# Patient Record
Sex: FEMALE | Race: WHITE | NOT HISPANIC OR LATINO | Employment: STUDENT | URBAN - METROPOLITAN AREA
[De-identification: names, ages, dates, MRNs, and addresses within clinical notes are randomized per-mention and may not be internally consistent; named-entity substitution may affect disease eponyms.]

---
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
## 2025-01-14 DIAGNOSIS — R11.2 NAUSEA AND VOMITING, UNSPECIFIED VOMITING TYPE: ICD-10-CM

## 2025-01-14 RX ORDER — FAMOTIDINE 40 MG/1
40 TABLET, FILM COATED ORAL
Qty: 30 TABLET | Refills: 12 | Status: SHIPPED | OUTPATIENT
Start: 2025-01-14

## 2025-01-16 ENCOUNTER — PATIENT MESSAGE (OUTPATIENT)
Dept: PEDIATRIC NEUROLOGY | Facility: CLINIC | Age: 20
End: 2025-01-16
Payer: COMMERCIAL

## 2025-02-18 DIAGNOSIS — G40.109 FOCAL EPILEPSY: ICD-10-CM

## 2025-02-18 DIAGNOSIS — G40.219 PARTIAL SYMPTOMATIC EPILEPSY WITH COMPLEX PARTIAL SEIZURES, INTRACTABLE, WITHOUT STATUS EPILEPTICUS: ICD-10-CM

## 2025-02-18 RX ORDER — OXCARBAZEPINE 300 MG/1
TABLET, FILM COATED ORAL
Qty: 90 TABLET | Refills: 5 | Status: SHIPPED | OUTPATIENT
Start: 2025-02-18

## 2025-03-11 ENCOUNTER — OFFICE VISIT (OUTPATIENT)
Dept: PEDIATRIC NEUROLOGY | Facility: CLINIC | Age: 20
End: 2025-03-11
Payer: COMMERCIAL

## 2025-03-11 VITALS
BODY MASS INDEX: 22.73 KG/M2 | DIASTOLIC BLOOD PRESSURE: 72 MMHG | SYSTOLIC BLOOD PRESSURE: 123 MMHG | HEART RATE: 72 BPM | HEIGHT: 61 IN | WEIGHT: 120.38 LBS

## 2025-03-11 DIAGNOSIS — G43.001 MIGRAINE WITHOUT AURA AND WITH STATUS MIGRAINOSUS, NOT INTRACTABLE: ICD-10-CM

## 2025-03-11 DIAGNOSIS — G40.219 PARTIAL SYMPTOMATIC EPILEPSY WITH COMPLEX PARTIAL SEIZURES, INTRACTABLE, WITHOUT STATUS EPILEPTICUS: ICD-10-CM

## 2025-03-11 DIAGNOSIS — G40.109 FOCAL EPILEPSY: Primary | ICD-10-CM

## 2025-03-11 PROCEDURE — 3078F DIAST BP <80 MM HG: CPT | Mod: CPTII,S$GLB,, | Performed by: STUDENT IN AN ORGANIZED HEALTH CARE EDUCATION/TRAINING PROGRAM

## 2025-03-11 PROCEDURE — 3074F SYST BP LT 130 MM HG: CPT | Mod: CPTII,S$GLB,, | Performed by: STUDENT IN AN ORGANIZED HEALTH CARE EDUCATION/TRAINING PROGRAM

## 2025-03-11 PROCEDURE — G2211 COMPLEX E/M VISIT ADD ON: HCPCS | Mod: S$GLB,,, | Performed by: STUDENT IN AN ORGANIZED HEALTH CARE EDUCATION/TRAINING PROGRAM

## 2025-03-11 PROCEDURE — 99999 PR PBB SHADOW E&M-EST. PATIENT-LVL IV: CPT | Mod: PBBFAC,,, | Performed by: STUDENT IN AN ORGANIZED HEALTH CARE EDUCATION/TRAINING PROGRAM

## 2025-03-11 PROCEDURE — 99214 OFFICE O/P EST MOD 30 MIN: CPT | Mod: S$GLB,,, | Performed by: STUDENT IN AN ORGANIZED HEALTH CARE EDUCATION/TRAINING PROGRAM

## 2025-03-11 PROCEDURE — 3008F BODY MASS INDEX DOCD: CPT | Mod: CPTII,S$GLB,, | Performed by: STUDENT IN AN ORGANIZED HEALTH CARE EDUCATION/TRAINING PROGRAM

## 2025-03-11 RX ORDER — RIZATRIPTAN BENZOATE 10 MG/1
10 TABLET ORAL DAILY PRN
Qty: 9 TABLET | Refills: 5 | Status: SHIPPED | OUTPATIENT
Start: 2025-03-11

## 2025-03-11 RX ORDER — OXCARBAZEPINE 300 MG/1
450 TABLET, FILM COATED ORAL 2 TIMES DAILY
Qty: 90 TABLET | Refills: 5 | Status: SHIPPED | OUTPATIENT
Start: 2025-03-11

## 2025-03-11 RX ORDER — MIDAZOLAM 5 MG/.1ML
5 SPRAY NASAL DAILY PRN
Qty: 1 EACH | Refills: 2 | Status: SHIPPED | OUTPATIENT
Start: 2025-03-11

## 2025-03-11 NOTE — PATIENT INSTRUCTIONS
Continue OXC 450mg BID;  Nayzilam for seizure > 5min      Repeat EEG - if normal can attempt slow wean     Acute abortive treatment:     When migraine symptoms first develop, the patient should rest or sleep in a dark, quiet room with a cool cloth applied to forehead if possible. Early use of medication during the migraine attack, when the headache is still mild, is important      Step 1: For mild headaches or as first step in treatment, give ibuprofen solution or tablet 400mg every 4 to 6 hours as needed (max 4 doses in 24 hours)               -Limit to 14 days per month maximum to avoid medication overuse headache               -If this medication proves ineffective, would next try naproxen sodium tablet 440mg every 8 to 12 hours as needed (max daily dose 1000mg)      Step 2: If step 1 medication does not get rid of headache, or if headache is severe from the start, also give rizatriptan 10mg oral tablet               -This dose may be repeated a second time if headache still remains after 2 hours, with maximum of 2 doses per 24 hours               -Limit use to 9 days per month to avoid medication overuse headache               -You may combine this medication with naproxen 5mg/kg for better effect if it is only somewhat effective               - Side effects may include chest pain/pressure/tightness, hot/cold flashes, sore throat, fatigue, feeling of heaviness, tingling, jaw pain/pressure, neck pain               -If this medication proves ineffective, would next try rizatriptan 10mg ODT     Daily preventive treatment     Given that this patient has frequent or long-lasting migraines, migraines that cause significant disability, will initiate prevention at this time with:     1) riboflavin (vitamin B2) 400mg per day in 1-2 doses. This may cause stomach upset if taken on empty stomach. It can cause bright yellow or yellow-orange discoloration of urine   2) elemental magnesium or magnesium oxide at 400MG in 1-2  doses. May cause diarrhea     They have previously tried 0 other preventive medications which were stopped for either side effects or lack of efficacy               -Should be continued for at least 6-8 weeks before determining effectiveness               -Headache diary should be maintained so that frequency of headaches can be compared once on the medication              -If this proves ineffective or side effects are not tolerated, would next try amitriptyline               -If medication proves effective, it should be continued for at least 6-12 months before considering to wean medication      Lifestyle measures   Education: Check out [employer] for more education on headaches, a website created by pediatric headache specialists   Sleep: Work on getting sufficient sleep along with keeping relatively constant bedtime and wake-up times on weekdays and weekends  Exercise: Regular exercise for at least 30 minutes a day for 5 days a week may decrease frequency of headaches   Hydration: Aim to drink at least 64 ounces of water every day, ideally 80 ounces. Carry a water bottle around to school to make this easier   Meals: Avoid fasting or skipping meals because this may trigger headaches      Utilize mychart to notify office of side effects, effects of acute medications after 2-3 tries, effects of preventive medications after 6-8 weeks     Return to clinic in ~6 months for reassessment

## 2025-03-11 NOTE — PROGRESS NOTES
Subjective:      Patient ID: Nora Phillip is a 19 y.o. female here for   Chief Complaint   Patient presents with    Migraine        Interim #4:     Current HA freq: 3 days out of last 30, with 0 days considered bad/severe  Last HA freq: 3 days out of prior 30d, with 2 days considered bad/severe     Current acute: ibuprofen/rizatriptan  Current preventive: magox/riboflavin      Trileptal 450mg bid - taking without seizure or s/e     Interim #3:    Current HA freq: 3 days out of last 30, with 2 days considered bad/severe  Last HA freq: 0 days out of prior 30d, with 0 days considered bad/severe     Current acute: ibuprofen/rizatriptan  Current preventive: none;     sLEEP: 10PM-630am   Meals: doesn't skip meals   Water: 16oz per day;     Oxc 450MG twice daily;     Interim #2: At last visit I prescribed ibuprofen and rizatriptan as acute headache treatment and planned to start twice daily magnesium and riboflavin for nutraceutical headache prevention. Planned repeat eeg not done yet. Last OXC level was 21, improved     Current HA freq: 0 days out of last 30, with 0 days considered bad/severe  Last HA freq: 12 days out of prior 30d, with 4 days considered bad/severe     Current acute: ibuprofen/rizatriptan  Current preventive: none;     Interim 1: 12/30 with 4 bad HA currently    Ibuprofen 400mg - works sometimes     17 y.o. female with PMH of IVH, multifocal epilepsy, Chiari I malformation and hydrocephalus s/p ETV      Interval history 01/3/2023:  Very low dose OXC  Had been seizure free for 5 years  Had 2 events early December, with tremors to her arms and legs and was nauseated followed by post ictal lethargy.  Dr. Diaz increased OXC from 450 mg daily to 600 mg daily.  Since then no further seizures.  Has labs with PCM, trileptal level was 10.  Tsh and NA WNL.        he also follows with Dr Taylor since Nov 2017 after she developed hydrocephalus and was admitted for ETV.    shunt was placed  10/31/20  Family history of migraines     She has episodes of overheating and dehydration        CT head 10/30/20- Interval placement of ventriculostomy shunt catheter, as discussed above with findings consistent with recent procedure, ventricular dilatation again noted although stable when compared to the prior MRI examination with perhaps mild diminished prominence of the temporal horns.  There is no evidence for significant interval detrimental change.     MRI head 2/13/19- No appreciable residual defect along the floor of the 3rd ventricle and no appreciable flow through this region, raising the question of closure of the 3rd ventriculostomy.  Mild prominence of the supratentorial ventricles is unchanged.  Unchanged appearance of tract through the right frontal parenchyma and defect in the septum pellucidum.  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  Unchanged mild inferior descent of the cerebellar tonsils, with CSF flow through the foramen magnum.     MRI head 11/27/17- Interval operative change status post right frontal approach endoscopic third ventriculostomy. There is small postoperative fluid tract within the right frontal lobe   There is continued though relatively similar moderate distention of the lateral and third ventricles   Continued small caliber fourth ventricle with configuration posterior fossa compatible with Chiari I malformation     MRI head 8/21/17- Moderate distention of the lateral and third ventricles which may represent ventriculomegaly in light of history however uncertain of chronicity and lack of prior imaging for comparison. There is trace flair hyperintensity in the periventricular white matter suggestive for scarring versus small component of transependymal resorption of CSF. Clinical correlation advised.  Chiari I malformation.  Superimposed colpocephaly with small caliber white matter parietal lobes concerning for possible prior PVL     EEG 8.21.17 read by me-  multifocal sharps most frequent over right frontal area        Current Outpatient Medications   Medication Instructions    cloNIDine (CATAPRES) 0.2 mg    cloNIDine 0.2 mg/24 hr td ptwk (CATAPRES) 0.2 mg/24 hr 0.2mg per day 1/day    dextroamphetamine-amphetamine 5 mg Tab 2 tablets    famotidine (PEPCID) 40 mg, Oral    LO LOESTRIN FE 1 mg-10 mcg (24)/10 mcg (2) Tab 1 tablet, Daily    NAYZILAM 5 mg, Each Nostril, Daily PRN    ondansetron (ZOFRAN) 4 mg, Oral, Every 6 hours PRN    OXcarbazepine (TRILEPTAL) 300 MG Tab TAKE 1 & 1/2 TABLETS BY MOUTH TWICE DAILY    rizatriptan (MAXALT) 10 mg, Oral, Daily PRN          Review of Systems   Constitutional:  Negative for fatigue, fever and unexpected weight change.   HENT:  Negative for congestion, dental problem, ear pain, hearing loss, sinus pain and sore throat.    Eyes:  Positive for photophobia. Negative for pain and visual disturbance.   Respiratory:  Negative for cough and shortness of breath.    Cardiovascular:  Negative for chest pain and palpitations.   Gastrointestinal:  Positive for nausea. Negative for abdominal pain, constipation, diarrhea and vomiting.   Genitourinary:  Negative for difficulty urinating.   Musculoskeletal:  Negative for arthralgias, back pain, gait problem and neck pain.   Skin:  Negative for rash.   Allergic/Immunologic: Negative for environmental allergies.   Neurological:  Positive for seizures and headaches. Negative for dizziness, tremors, syncope, speech difficulty, weakness, light-headedness and numbness.   Hematological:  Does not bruise/bleed easily.   Psychiatric/Behavioral:  Negative for confusion and sleep disturbance. The patient is not nervous/anxious.        Objective:   Neurologic Exam     Mental Status   Follows 2 step commands.   Attention: normal. Concentration: normal.   Speech: speech is normal   Level of consciousness: alert    Cranial Nerves     CN II   Visual fields full to confrontation.     CN III, IV, VI   Pupils are  "equal, round, and reactive to light.  Extraocular motions are normal.   Nystagmus: none     CN V   Facial sensation intact.     CN VII   Facial expression full, symmetric.     CN VIII   Hearing: intact    CN XII   Tongue deviation: none    Motor Exam   Muscle bulk: normal  Overall muscle tone: normal    Strength   Strength 5/5 throughout.     Sensory Exam   Light touch normal.     Gait, Coordination, and Reflexes     Gait  Gait: normal    Coordination   Finger to nose coordination: normal  Tandem walking coordination: normal    Reflexes   Right brachioradialis: 2+  Right biceps: 2+  Right triceps: 2+  Right patellar: 2+  Right achilles: 2+  Right ankle clonus: absent  Left ankle clonus: absent    /72   Pulse 72   Ht 5' 1.14" (1.553 m)   Wt 54.6 kg (120 lb 5.9 oz)   BMI 22.64 kg/m²      Physical Exam  Vitals reviewed.   Constitutional:       Appearance: Normal appearance.   HENT:      Head: Normocephalic.   Eyes:      Conjunctiva/sclera: Conjunctivae normal.      Pupils: Pupils are equal, round, and reactive to light.   Pulmonary:      Effort: Pulmonary effort is normal. No respiratory distress.   Musculoskeletal:         General: No swelling. Normal range of motion.   Neurological:      Mental Status: She is alert.      Coordination: Finger-Nose-Finger Test normal.      Gait: Gait is intact. Tandem walk normal.      Deep Tendon Reflexes:      Reflex Scores:       Tricep reflexes are 2+ on the right side.       Bicep reflexes are 2+ on the right side.       Brachioradialis reflexes are 2+ on the right side.       Patellar reflexes are 2+ on the right side.       Achilles reflexes are 2+ on the right side.  Psychiatric:         Speech: Speech normal.       Assessment:     Nora is a 19 Years old female with PMH of IVH, multifocal epilepsy, Chiari I malformation and hydrocephalus s/p ETV  who presents for evaluation of epilepsy, migraine     This patient meets criteria for a diagnosis of Episodic Migraine w/o " aura due to the following:    Recurrent (at least 5) episodes of moderate to severe head pain lasting (2 or more) hours and accompanied by:  - Nausea and/or vomiting  - Photophobia  - Phonophobia     Seizures well controlled, will repeat EEG next appt and could consider wean in future, continuing OXC for now     Headaches have remained low frequency even without nutraceuticals so suggested adding these for even more improvement if possible;    Plan:     Plan:     Continue OXC 450mg BID;  Nayzilam for seizure > 5min     Repeat EEG - if normal can attempt slow wean    Acute abortive treatment:    When migraine symptoms first develop, the patient should rest or sleep in a dark, quiet room with a cool cloth applied to forehead if possible. Early use of medication during the migraine attack, when the headache is still mild, is important     Step 1: For mild headaches or as first step in treatment, give ibuprofen solution or tablet 400mg every 4 to 6 hours as needed (max 4 doses in 24 hours)    -Limit to 14 days per month maximum to avoid medication overuse headache    -If this medication proves ineffective, would next try naproxen sodium tablet 440mg every 8 to 12 hours as needed (max daily dose 1000mg)     Step 2: If step 1 medication does not get rid of headache, or if headache is severe from the start, also give rizatriptan 10mg oral tablet    -This dose may be repeated a second time if headache still remains after 2 hours, with maximum of 2 doses per 24 hours    -Limit use to 9 days per month to avoid medication overuse headache    -You may combine this medication with naproxen 5mg/kg for better effect if it is only somewhat effective    - Side effects may include chest pain/pressure/tightness, hot/cold flashes, sore throat, fatigue, feeling of heaviness, tingling, jaw pain/pressure, neck pain    -If this medication proves ineffective, would next try rizatriptan 10mg ODT    Daily preventive treatment    Given that  this patient has frequent or long-lasting migraines, migraines that cause significant disability, will initiate prevention at this time with:    1) riboflavin (vitamin B2) 400mg per day in 1-2 doses. This may cause stomach upset if taken on empty stomach. It can cause bright yellow or yellow-orange discoloration of urine   2) elemental magnesium or magnesium oxide at 400MG in 1-2 doses. May cause diarrhea    They have previously tried 0 other preventive medications which were stopped for either side effects or lack of efficacy    -Should be continued for at least 6-8 weeks before determining effectiveness    -Headache diary should be maintained so that frequency of headaches can be compared once on the medication   -If this proves ineffective or side effects are not tolerated, would next try amitriptyline    -If medication proves effective, it should be continued for at least 6-12 months before considering to wean medication     Lifestyle measures   Education: Check out [employer] for more education on headaches, a website created by pediatric headache specialists   Sleep: Work on getting sufficient sleep along with keeping relatively constant bedtime and wake-up times on weekdays and weekends  Exercise: Regular exercise for at least 30 minutes a day for 5 days a week may decrease frequency of headaches   Hydration: Aim to drink at least 64 ounces of water every day, ideally 80 ounces. Carry a water bottle around to school to make this easier   Meals: Avoid fasting or skipping meals because this may trigger headaches     Utilize mychart to notify office of side effects, effects of acute medications after 2-3 tries, effects of preventive medications after 6-8 weeks    Return to clinic in ~6 months for reassessment     Grant Varghese MD  [facility]   [facility]

## 2025-03-12 ENCOUNTER — OFFICE VISIT (OUTPATIENT)
Dept: PEDIATRIC GASTROENTEROLOGY | Facility: CLINIC | Age: 20
End: 2025-03-12
Payer: COMMERCIAL

## 2025-03-12 VITALS
WEIGHT: 117.75 LBS | HEIGHT: 60 IN | HEART RATE: 65 BPM | OXYGEN SATURATION: 99 % | TEMPERATURE: 97 F | BODY MASS INDEX: 23.12 KG/M2 | DIASTOLIC BLOOD PRESSURE: 76 MMHG | SYSTOLIC BLOOD PRESSURE: 123 MMHG

## 2025-03-12 DIAGNOSIS — R11.2 NAUSEA AND VOMITING, UNSPECIFIED VOMITING TYPE: Primary | ICD-10-CM

## 2025-03-12 DIAGNOSIS — K21.00 GASTROESOPHAGEAL REFLUX DISEASE WITH ESOPHAGITIS WITHOUT HEMORRHAGE: ICD-10-CM

## 2025-03-12 DIAGNOSIS — R10.33 PERIUMBILICAL ABDOMINAL PAIN: ICD-10-CM

## 2025-03-12 PROCEDURE — 3008F BODY MASS INDEX DOCD: CPT | Mod: CPTII,S$GLB,, | Performed by: PEDIATRICS

## 2025-03-12 PROCEDURE — 3074F SYST BP LT 130 MM HG: CPT | Mod: CPTII,S$GLB,, | Performed by: PEDIATRICS

## 2025-03-12 PROCEDURE — 1160F RVW MEDS BY RX/DR IN RCRD: CPT | Mod: CPTII,S$GLB,, | Performed by: PEDIATRICS

## 2025-03-12 PROCEDURE — 1159F MED LIST DOCD IN RCRD: CPT | Mod: CPTII,S$GLB,, | Performed by: PEDIATRICS

## 2025-03-12 PROCEDURE — 3078F DIAST BP <80 MM HG: CPT | Mod: CPTII,S$GLB,, | Performed by: PEDIATRICS

## 2025-03-12 PROCEDURE — 99214 OFFICE O/P EST MOD 30 MIN: CPT | Mod: S$GLB,,, | Performed by: PEDIATRICS

## 2025-03-12 PROCEDURE — 99999 PR PBB SHADOW E&M-EST. PATIENT-LVL IV: CPT | Mod: PBBFAC,,, | Performed by: PEDIATRICS

## 2025-03-12 RX ORDER — FAMOTIDINE 20 MG/1
20 TABLET, FILM COATED ORAL 2 TIMES DAILY
Qty: 60 TABLET | Refills: 12 | Status: SHIPPED | OUTPATIENT
Start: 2025-03-12

## 2025-03-12 NOTE — PROGRESS NOTES
Subjective:       Patient ID: Nora Phillip is a 19 y.o. female.    Chief Complaint: Follow-up    HPI  Review of Systems   Constitutional:  Positive for fatigue. Negative for activity change, appetite change, fever and unexpected weight change.   HENT:  Positive for trouble swallowing. Negative for congestion, hearing loss, mouth sores, rhinorrhea and sore throat.    Eyes:  Negative for photophobia and visual disturbance.   Respiratory:  Negative for apnea, cough, choking, chest tightness, shortness of breath, wheezing and stridor.    Cardiovascular:  Negative for chest pain.   Gastrointestinal:  Positive for abdominal pain, nausea and vomiting. Negative for blood in stool and diarrhea.   Endocrine: Negative for heat intolerance.   Genitourinary:  Negative for decreased urine volume, dysuria and menstrual problem.   Musculoskeletal:  Positive for arthralgias. Negative for back pain, joint swelling, myalgias, neck pain and neck stiffness.   Skin:  Positive for pallor. Negative for rash.   Allergic/Immunologic: Negative for food allergies.   Neurological:  Positive for seizures, weakness and headaches.   Hematological:  Negative for adenopathy. Does not bruise/bleed easily.   Psychiatric/Behavioral:  Positive for sleep disturbance. Negative for agitation. The patient is not nervous/anxious and is not hyperactive.        Objective:      Physical Exam    Assessment:       1. Nausea and vomiting, unspecified vomiting type    2. Gastroesophageal reflux disease with esophagitis without hemorrhage    3. Periumbilical abdominal pain        Plan:         CHIEF COMPLAINT: Patient is here for follow up of abdominal pain nausea and vomiting.    HISTORY OF PRESENT ILLNESS:  Patient follows up today for ongoing care above symptoms.  She does better with 20 mg twice a day then 40 mg daily of the Pepcid.  As long as she takes the Pepcid her symptoms are under good control.  She reports no real pain or vomiting.  She gets  "these symptoms if she comes off of the medication.  She did have a few eosinophils on a biopsies from EGD a few years back consistent with reflux.  Bowel movements are normal.  No menstrual cycles as her oral contraceptive as stopped those.  She was seen by Neurology yesterday.  They plan to try and wean her seizure medications this summer.  Mom says that pediatric services still plan to see her for now.  No trouble swallowing.    STUDIES REVIEWED:  Increased eosinophils on biopsy consistent with reflux on her EGD    MEDICATIONS/ALLERGIES: The patient's MedCard has been reviewed and/or reconciled.    PMH, SH, FH, all reviewed and no changes except as noted.    PHYSICAL EXAMINATION:   /76 (BP Location: Right arm, Patient Position: Sitting)   Pulse 65   Temp 97.4 °F (36.3 °C) (Temporal)   Ht 5' 0.39" (1.534 m)   Wt 53.4 kg (117 lb 11.6 oz)   SpO2 99%   BMI 22.69 kg/m²  weight stable the 35th percentile  Remainder of vital signs unremarkable, please refer to vital signs sheet.  General: Alert, WN, WH, NAD  Chest: Clear to auscultation bilaterally.No increased work of breathing   Heart: Regular, rate and rhythm without murmur  Abdomen: Soft, non tender, non distended, no hepatosplenomegaly, no stool masses, no rebound or guarding.  Extremities: Symmetric, well perfused and no edema.      IMPRESSION/PLAN:  Patient follows up today for ongoing care above symptoms.  Patient is stable on the Pepcid.  We will send it in for b.i.d. dosing at 20 mg.  I did discuss transition of care to adult GI.  Certainly if she is being followed by some of the pediatric services still we can continue to do so as well.  I can certainly see her back at least 1 more time in a year and then look to transition care.  She maybe able to wean off of her seizure medications and not need neurology follow-up.  She likely will be followed by her current pediatric cardiologist but in [facility] over time.  Will make a " referral next year after seeing her back at least 1 more time.  Patient Instructions   Pepcid 20 mg Po 2x/day  Limit/avoid symptom inducing food  Follow up one year     This was discussed at length with parents who expressed understanding and agreement. Questions were answered.  This note has been dictated using voice recognition software.  Note sent to referring physician via [employer] or fax

## 2025-03-12 NOTE — LETTER
March 12, 2025        Carrington Tinajero MD  [address] [place] [address]  [address]  [place] [address]             [address]  [address]  [place] [address]  Phone: [phone]   Patient: Nora Phillip   MR Number: [id number]   YOB: 2005   Date of Visit: 3/12/2025       Dear Dr. Tinajero:    Thank you for referring Nora Phillip to me for evaluation. Attached you will find relevant portions of my assessment and plan of care.    If you have questions, please do not hesitate to call me. I look forward to following Nora Phillip along with you.    Sincerely,      Tyson Gant MD            CC  No Recipients    Enclosure

## 2025-03-31 ENCOUNTER — TELEPHONE (OUTPATIENT)
Dept: NEUROSURGERY | Facility: CLINIC | Age: 20
End: 2025-03-31
Payer: COMMERCIAL

## 2025-03-31 NOTE — TELEPHONE ENCOUNTER
Called pt mom and told her that falls are most likely mechanical in nature. Need to  f/u with pcp for full body workup    ----- Message from Tia sent at 3/28/2025 11:55 AM CDT -----  Who Called: Pt mom What is the request in detail: Requesting call back to discuss pt fell and needs urgent call back. Please adviseCan the clinic reply by [facility]? [place] Call Back Number: [phone] Information:

## 2025-04-01 ENCOUNTER — HOSPITAL ENCOUNTER (EMERGENCY)
Facility: HOSPITAL | Age: 20
Discharge: HOME | End: 2025-04-02
Attending: STUDENT IN AN ORGANIZED HEALTH CARE EDUCATION/TRAINING PROGRAM
Payer: COMMERCIAL

## 2025-04-01 DIAGNOSIS — R51.9 ACUTE NONINTRACTABLE HEADACHE, UNSPECIFIED HEADACHE TYPE: Primary | ICD-10-CM

## 2025-04-01 PROCEDURE — 86803 HEPATITIS C AB TEST: CPT | Performed by: PHYSICIAN ASSISTANT

## 2025-04-01 PROCEDURE — 99285 EMERGENCY DEPT VISIT HI MDM: CPT

## 2025-04-01 PROCEDURE — 84460 ALANINE AMINO (ALT) (SGPT): CPT | Performed by: STUDENT IN AN ORGANIZED HEALTH CARE EDUCATION/TRAINING PROGRAM

## 2025-04-01 PROCEDURE — 87389 HIV-1 AG W/HIV-1&-2 AB AG IA: CPT | Performed by: PHYSICIAN ASSISTANT

## 2025-04-01 PROCEDURE — 85025 COMPLETE CBC W/AUTO DIFF WBC: CPT | Performed by: STUDENT IN AN ORGANIZED HEALTH CARE EDUCATION/TRAINING PROGRAM

## 2025-04-01 PROCEDURE — 81025 URINE PREGNANCY TEST: CPT | Performed by: EMERGENCY MEDICINE

## 2025-04-02 ENCOUNTER — PATIENT MESSAGE (OUTPATIENT)
Dept: NEUROSURGERY | Facility: CLINIC | Age: 20
End: 2025-04-02
Payer: COMMERCIAL

## 2025-04-02 ENCOUNTER — PATIENT MESSAGE (OUTPATIENT)
Dept: PEDIATRIC NEUROLOGY | Facility: CLINIC | Age: 20
End: 2025-04-02
Payer: COMMERCIAL

## 2025-04-02 VITALS
HEIGHT: 60 IN | SYSTOLIC BLOOD PRESSURE: 118 MMHG | WEIGHT: 117 LBS | OXYGEN SATURATION: 98 % | RESPIRATION RATE: 16 BRPM | HEART RATE: 64 BPM | DIASTOLIC BLOOD PRESSURE: 78 MMHG | BODY MASS INDEX: 22.97 KG/M2 | TEMPERATURE: 98 F

## 2025-04-02 LAB
ABSOLUTE EOSINOPHIL (OHS): 0.27 K/UL
ABSOLUTE MONOCYTE (OHS): 0.85 K/UL (ref 0.3–1)
ABSOLUTE NEUTROPHIL COUNT (OHS): 5.7 K/UL (ref 1.8–7.7)
ALBUMIN SERPL BCP-MCNC: 4.2 G/DL (ref 3.5–5.2)
ALP SERPL-CCNC: 121 UNIT/L (ref 40–150)
ALT SERPL W/O P-5'-P-CCNC: 18 UNIT/L (ref 10–44)
ANION GAP (OHS): 16 MMOL/L (ref 8–16)
AST SERPL-CCNC: 19 UNIT/L (ref 11–45)
B-HCG UR QL: NEGATIVE
BASOPHILS # BLD AUTO: 0.04 K/UL
BASOPHILS NFR BLD AUTO: 0.4 %
BILIRUB SERPL-MCNC: 0.3 MG/DL (ref 0.1–1)
BUN SERPL-MCNC: 20 MG/DL (ref 6–20)
CALCIUM SERPL-MCNC: 10 MG/DL (ref 8.7–10.5)
CHLORIDE SERPL-SCNC: 107 MMOL/L (ref 95–110)
CO2 SERPL-SCNC: 16 MMOL/L (ref 23–29)
CREAT SERPL-MCNC: 0.7 MG/DL (ref 0.5–1.4)
CTP QC/QA: YES
ERYTHROCYTE [DISTWIDTH] IN BLOOD BY AUTOMATED COUNT: 12.2 % (ref 11.5–14.5)
GFR SERPLBLD CREATININE-BSD FMLA CKD-EPI: >60 ML/MIN/1.73/M2
GLUCOSE SERPL-MCNC: 77 MG/DL (ref 70–110)
HCT VFR BLD AUTO: 49.8 % (ref 37–48.5)
HCV AB SERPL QL IA: NORMAL
HGB BLD-MCNC: 16.1 GM/DL (ref 12–16)
HIV 1+2 AB+HIV1 P24 AG SERPL QL IA: NORMAL
IMM GRANULOCYTES # BLD AUTO: 0.03 K/UL (ref 0–0.04)
IMM GRANULOCYTES NFR BLD AUTO: 0.3 % (ref 0–0.5)
LYMPHOCYTES # BLD AUTO: 2.59 K/UL (ref 1–4.8)
MCH RBC QN AUTO: 28.8 PG (ref 27–31)
MCHC RBC AUTO-ENTMCNC: 32.3 G/DL (ref 32–36)
MCV RBC AUTO: 89 FL (ref 82–98)
NUCLEATED RBC (/100WBC) (OHS): 0 /100 WBC
PLATELET # BLD AUTO: 261 K/UL (ref 150–450)
PMV BLD AUTO: 10.7 FL (ref 9.2–12.9)
POTASSIUM SERPL-SCNC: 4.3 MMOL/L (ref 3.5–5.1)
PROT SERPL-MCNC: 7.5 GM/DL (ref 6–8.4)
RBC # BLD AUTO: 5.6 M/UL (ref 4–5.4)
RELATIVE EOSINOPHIL (OHS): 2.8 %
RELATIVE LYMPHOCYTE (OHS): 27.3 % (ref 18–48)
RELATIVE MONOCYTE (OHS): 9 % (ref 4–15)
RELATIVE NEUTROPHIL (OHS): 60.2 % (ref 38–73)
SODIUM SERPL-SCNC: 139 MMOL/L (ref 136–145)
WBC # BLD AUTO: 9.48 K/UL (ref 3.9–12.7)

## 2025-04-02 NOTE — HPI
18 yo who presents after having an episode of positional nausea and headache. Pt reports yesterday that when she sat up she became nauseated and began to have a headache that resolved after she layed back down. Pt reports that she is doing better now and no longer has nausea while sitting up and her headache is much improved while sitting up as well. CTH and XRSS were obtained showing stable ventricular caliber and Certas at 4. Valve pump and refills easily

## 2025-04-02 NOTE — ASSESSMENT & PLAN NOTE
18 yo with PMH of Chiari I malformation and hydrocephalus s/p ETV x 2 and placement of a right VPS (Certas @4) presenting today due to headaches and an episode of positional nausea that occurred yesterday. CTH showed stable ventricular caliber and XRSS showed a certas at 4 without discontinuity in her shunt tubing.    Recommendations:  --NSGY evaluated at bedside  --Labs/imaging reviewed  --no acute neurosurgical intervention at this time  --Will schedule 2 week video visit for follow up  --Okay to dc home    Discussed with Dr. An  Dispo: home   Patient

## 2025-04-02 NOTE — FIRST PROVIDER EVALUATION
Emergency Department First Provider Evaluation    Nora Phillip   19 y.o. female   [id number]      4/1/2025        Medical screening examination initiated prior to patient room assignment.        History of present illness:   shunt. Having positional head pain and nausea.    Vitals:    04/01/25 2038   BP: 126/82   BP Location: Right arm   Pulse: 86   Resp: 16   Temp: 98.1 °F (36.7 °C)   TempSrc: Oral   SpO2: 100%   Weight: 53.1 kg (117 lb)   Height: 5' (1.524 m)       Pertinent physical examination findings:  No distress.    Brief workup plan:  CT and shunt series ordered. Await bed for provider evaluation.          This brief and focused assessment was performed to initiate workup and treatment. Follow-up of these results will be performed by the assigned treatment team. Additional data collection, labs, imaging studies, and treatments may be needed for completion of this patient encounter.

## 2025-04-02 NOTE — ED PROVIDER NOTES
Encounter Date: 4/1/2025       History     Chief Complaint   Patient presents with    Headache     Headaches and dizziness.  Shunt     HPI    18 y/o F PMH chiari malformation, hydrocephalus,  shunt, epilepsy who presents to the ED for evaluation for a headache.  Patient follows with KORIN here.  She comes in today with a posterior headache for a few days. Associated with nausea. Worse with certain positions.   No head trauma.  Denies any numbness, tingling, head strike, neck pain, abd pain, or vision/speech changes.      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    Seizures      Past Surgical History:   Procedure Laterality Date    ANGIOGRAM, CORONARY, PEDIATRIC  1/3/2024    Procedure: Angiogram, Coronary, Pediatric;  Surgeon: Dony Cunningham Jr., MD;  Location: [facility] CATH LAB;  Service: Pediatric Cardiology;;    CLOSURE, PFO, PEDIATRIC N/A 1/3/2024    Procedure: Closure, PFO, Pediatric;  Surgeon: Dony Cunningham Jr., MD;  Location: [facility] CATH LAB;  Service: Pediatric Cardiology;  Laterality: N/A;    COMBINED RIGHT AND RETROGRADE LEFT HEART CATHETERIZATION FOR CONGENITAL HEART DEFECT N/A 1/3/2024    Procedure: Catheterization, Heart, Combined Right and Retrograde Left, for Congenital Heart Defect;  Surgeon: Dony Cunningham Jr., MD;  Location: [facility] CATH LAB;  Service: Pediatric Cardiology;  Laterality: N/A;    ECHOCARDIOGRAM,TRANSESOPHAGEAL  1/3/2024    Procedure: Transesophageal echo (GAMA) intra-procedure log documentation;  Surgeon: Dony Cunningham Jr., MD;  Location: [facility] CATH LAB;  Service: Pediatric Cardiology;;    ENDOSCOPIC VENTRICULOSTOMY Right 5/7/2020    Procedure: VENTRICULOSTOMY, ENDOSCOPIC;  Surgeon: Arun Taylor MD;  Location: [facility] OR [address] [place] [address];  Service: Neurosurgery;  Laterality: Right;  regular bed, [place], supine, toronto I, asa 1, stealth      ESOPHAGOGASTRODUODENOSCOPY N/A 9/27/2019    Procedure: ESOPHAGOGASTRODUODENOSCOPY (EGD);  Surgeon: Tyson Gant MD;  Location: [facility] ([address] [place] [address]);  Service: Endoscopy;  Laterality: N/A;    ETV      EYE SURGERY      9 months old. both eyes    REVISION OF VENTRICULOPERITONEAL SHUNT Right 2/5/2021    Procedure: REVISION, SHUNT, VENTRICULOPERITONEAL;  Surgeon: Rita Franklin MD;  Location: [facility] OR [address] [place] [address];  Service: Neurosurgery;  Laterality: Right;     Family History   Problem Relation Name Age of Onset    Migraines Mother      Hypertension Mother      Diabetes Father      Hypertension Father      No Known Problems Sister      No Known Problems Sister      Pacemaker/defibrilator Paternal Grandfather      Arrhythmia Neg Hx      Cardiomyopathy Neg Hx      Congenital heart disease Neg Hx      Heart attacks under age 50 Neg Hx       Social History[1]  Review of Systems   Neurological:  Positive for headaches.       Physical Exam     Initial Vitals [04/01/25 2038]   BP Pulse Resp Temp SpO2   126/82 86 16 98.1 °F (36.7 °C) 100 %      MAP       --         Physical Exam    Nursing note and vitals reviewed.  Constitutional: She appears well-nourished.   HENT:   Head: Atraumatic.   Eyes: EOM are normal.   Neck: Neck supple.   No C spine TTP   Normal range of motion.  Cardiovascular:  Normal rate and regular rhythm.           Pulmonary/Chest: Breath sounds normal. No respiratory distress.   Musculoskeletal:         General: No edema. Normal range of motion.      Cervical back: Normal range of motion and neck supple.     Neurological: She is alert and oriented to person, place, and time. No cranial nerve deficit or sensory deficit.   5/5 strength UE and LE extremities, all sensation intact, symmetric smile, tongue midline, no aphasia, no dysarthria, normal gait, NIHSS zero, no cerebellar signs   Skin: Skin is warm and dry.   Psychiatric: She has a normal mood and affect. Thought content normal.         ED Course    Procedures  Labs Reviewed   COMPREHENSIVE METABOLIC PANEL - Abnormal       Result Value    Sodium 139      Potassium 4.3      Chloride 107      CO2 16 (*)     Glucose 77      BUN 20      Creatinine 0.7      Calcium 10.0      Protein Total 7.5      Albumin 4.2      Bilirubin Total 0.3            AST 19      ALT 18      Anion Gap 16      eGFR >60     CBC WITH DIFFERENTIAL - Abnormal    WBC 9.48      RBC 5.60 (*)     HGB 16.1 (*)     HCT 49.8 (*)     MCV 89      MCH 28.8      MCHC 32.3      RDW 12.2      Platelet Count 261      MPV 10.7      Nucleated RBC 0      Neut % 60.2      Lymph % 27.3      Mono % 9.0      Eos % 2.8      Basophil % 0.4      Imm Grans % 0.3      Neut # 5.70      Lymph # 2.59      Mono # 0.85      Eos # 0.27      Baso # 0.04      Imm Grans # 0.03     HEPATITIS C ANTIBODY - Normal    Hep C Ab Interp Non-Reactive     HIV 1 / 2 ANTIBODY - Normal    HIV 1/2 Ag/Ab Non-Reactive     CBC W/ AUTO DIFFERENTIAL    Narrative:     The following orders were created for panel order CBC auto differential.  Procedure                               Abnormality         Status                     ---------                               -----------         ------                     CBC with Differential[[id number]]       Abnormal            Final result                 Please view results for these tests on the individual orders.   POCT URINE PREGNANCY    POC Preg Test, Ur Negative       Acceptable Yes                   Medications - No data to display  Medical Decision Making  Amount and/or Complexity of Data Reviewed  Labs: ordered. Decision-making details documented in ED Course.  Radiology: ordered and independent interpretation performed. Decision-making details documented in ED Course.                                  20 y/o F here with a headache. Hx of VPS.  VSS in ED, neuro intact on exam, NIHSS zero.  Patient declined anything for her symptoms while in the ED.  Normal WBC, normal  dagoberto.  XR shunt series appears normal.  Pending CTB and then NSGY consult at shift change, oncoming MD to follow and dispo. Patient and family updated on plan, she has remained stable and neuro intact.        Clinical Impression:  Final diagnoses:  [R51.9] Acute nonintractable headache, unspecified headache type (Primary)          ED Disposition Condition    Discharge Stable          ED Prescriptions    None       Follow-up Information       Follow up With Specialties Details Why Contact Info Additional Information    [facility] - Neurosurgery [facility]   [address]  [facility] [address] [phone] 8th Floor Clinic May Please park in [facility]. Check in desk is located in the lobby. Please take the C elevator to 8th floor which opens to the lobby.                 [1]   Social History  Tobacco Use    Smoking status: Never     Passive exposure: Never    Smokeless tobacco: Never   Substance Use Topics    Alcohol use: No    Drug use: No        Deandre Estrada MD  04/02/25 8217

## 2025-04-02 NOTE — CONSULTS
[facility] - Emergency Dept  Neurosurgery  Consult Note    Inpatient consult to Neurosurgery  Consult performed by: Wade De Jesus MD  Consult ordered by: Amy Layton MD        Subjective:     Chief Complaint/Reason for Admission: headache    History of Present Illness: 20 yo who presents after having an episode of positional nausea and headache. Pt reports yesterday that when she sat up she became nauseated and began to have a headache that resolved after she layed back down. Pt reports that she is doing better now and no longer has nausea while sitting up and her headache is much improved while sitting up as well. CTH and XRSS were obtained showing stable ventricular caliber and Certas at 4. 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    Seizures      Past Surgical History:   Procedure Laterality Date    ANGIOGRAM, CORONARY, PEDIATRIC  1/3/2024    Procedure: Angiogram, Coronary, Pediatric;  Surgeon: Dony Cunningham Jr., MD;  Location: [facility] CATH LAB;  Service: Pediatric Cardiology;;    CLOSURE, PFO, PEDIATRIC N/A 1/3/2024    Procedure: Closure, PFO, Pediatric;  Surgeon: Dony Cunningham Jr., MD;  Location: [facility] CATH LAB;  Service: Pediatric Cardiology;  Laterality: N/A;    COMBINED RIGHT AND RETROGRADE LEFT HEART CATHETERIZATION FOR CONGENITAL HEART DEFECT N/A 1/3/2024    Procedure: Catheterization, Heart, Combined Right and Retrograde Left, for Congenital Heart Defect;  Surgeon: Dony Cunningham Jr., MD;  Location: [facility] CATH LAB;  Service: Pediatric Cardiology;  Laterality: N/A;    ECHOCARDIOGRAM,TRANSESOPHAGEAL  1/3/2024    Procedure: Transesophageal echo (GAMA) intra-procedure log documentation;  Surgeon: Dony Cunningham Jr., MD;  Location: [facility] CATH LAB;  Service: Pediatric Cardiology;;    ENDOSCOPIC  VENTRICULOSTOMY Right 5/7/2020    Procedure: VENTRICULOSTOMY, ENDOSCOPIC;  Surgeon: Arun Taylor MD;  Location: [facility] OR 2ND FLR;  Service: Neurosurgery;  Laterality: Right;  regular bed, [place], supine, tor[place] I, asa 1, stealth     ESOPHAGOGASTRODUODENOSCOPY N/A 9/27/2019    Procedure: ESOPHAGOGASTRODUODENOSCOPY (EGD);  Surgeon: Tyson Gant MD;  Location: [facility] ENDO (2ND FLR);  Service: Endoscopy;  Laterality: N/A;    ETV      EYE SURGERY      9 months old. both eyes    REVISION OF VENTRICULOPERITONEAL SHUNT Right 2/5/2021    Procedure: REVISION, SHUNT, VENTRICULOPERITONEAL;  Surgeon: Rita Franklin MD;  Location: [facility] OR 2ND FLR;  Service: Neurosurgery;  Laterality: Right;     Family History       Problem Relation (Age of Onset)    Diabetes Father    Hypertension Mother, Father    Migraines Mother    No Known Problems Sister, Sister    Pacemaker/defibrilator Paternal Grandfather          Tobacco Use    Smoking status: Never     Passive exposure: Never    Smokeless tobacco: Never   Substance and Sexual Activity    Alcohol use: No    Drug use: No    Sexual activity: Never     Review of Systems  Objective:     Weight: 53.1 kg (117 lb)  Body mass index is 22.85 kg/m².  Vital Signs (Most Recent):  Temp: 98 °F (36.7 °C) (04/02/25 0203)  Pulse: 65 (04/02/25 0203)  Resp: 16 (04/02/25 0203)  BP: 119/81 (04/02/25 0203)  SpO2: 100 % (04/02/25 0203) Vital Signs (24h Range):  Temp:  [98 °F (36.7 °C)-98.1 °F (36.7 °C)] 98 °F (36.7 °C)  Pulse:  [65-86] 65  Resp:  [16] 16  SpO2:  [100 %] 100 %  BP: (119-126)/(81-82) 119/81                                 Physical Exam         Neurosurgery Physical Exam  Neurosurgery Physical Exam    General: well developed, well nourished, no distress.   HEENT: normocephalic, atraumatic  CV: regular rate   Pulmonary: normal respirations, no signs of respiratory distress  Abdomen: soft, non-distended, not tender to palpation  Skin: Skin is warm, dry and intact  Heme: no bruising    Neuro:  "  Mental Status: AO x4, no aphasia, no dysarthria   CN: PERRL, EOMI, VF intact to confrontation, sensation intact bilaterally, eyebrow raise and grimace symmetric, tongue midline  Motor: moves all extremities spontaneously, full strength throughout, no pronator drift   Sensory: intact to light touch throughout  Cerebellar: finger to nose intact bilaterally  Reflexes: -Jules's, -Babinski, no clonus. Patellar: 2+ bilaterally     Valve pump/refills briskly  Significant Labs:  No results for input(s): "GLU", "NA", "K", "CL", "CO2", "BUN", "CREATININE", "CALCIUM", "MG" in the last 48 hours.  Recent Labs   Lab 04/01/25  2336   WBC 9.48   HGB 16.1*   HCT 49.8*        No results for input(s): "LABPT", "INR", "APTT" in the last 48 hours.  Microbiology Results (last 7 days)       ** No results found for the last 168 hours. **          All pertinent labs from the last 24 hours have been reviewed.    Significant Diagnostics:  CT: CT Head Without Contrast  Result Date: 4/2/2025  Right parietal ventriculostomy shunt catheter is in unchanged position.  Ventricles are unchanged in size without evidence of hydrocephalus. Recurrent prominence of the dural venous sinuses, increased from 12/16/2024 but similar to 04/23/2024.  This could be related to intracranial hypotension.  Extensive dural venous sinus thrombosis could also potentially cause this appearance but is considered less likely.  Correlate clinically, and with follow-up imaging if clinically warranted. Otherwise, CT demonstrates no evidence of acute intracranial process. This report was flagged in Epic as abnormal. Electronically signed by resident: Oscar Narayan Date:    04/02/2025 Time:    01:45 Electronically signed by: Chaim Jewell Date:    04/02/2025 Time:    03:24    I have reviewed all pertinent imaging results/findings within the past 24 hours.    Assessment/Plan:     Chronic headaches  20 yo with PMH of Chiari I malformation and hydrocephalus s/p ETV x " 2 and placement of a right VPS (Certas @4) presenting today due to headaches and an episode of positional nausea that occurred yesterday. CTH showed stable ventricular caliber and XRSS showed a certas at 4 without discontinuity in her shunt tubing.    Recommendations:  --NSGY evaluated at bedside  --Labs/imaging reviewed  --no acute neurosurgical intervention at this time  --Will schedule 2 week video visit for follow up  --Okay to dc home    Discussed with Dr. Juve Uribe: home        Thank you for your consult.     Wade De Jesus MD  Neurosurgery  [facility]       [1] (Not in a hospital admission)

## 2025-04-02 NOTE — ED TRIAGE NOTES
"Nora Phillip, a 19 y.o. female presents to the ED w/ complaint of     Triage note:  Chief Complaint   Patient presents with    Headache     Headaches and dizziness.  Shunt   Pt denies blurred vision, dizziness, unilateral weakness. Reports constant pain to back of head down neck. Pt also states had one episode of legs "giving out" while on stairs causing pt to fall this past Friday. Pt denies striking head. Denies LOC. Denies recent seizures.   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

## 2025-04-03 ENCOUNTER — TELEPHONE (OUTPATIENT)
Dept: NEUROSURGERY | Facility: CLINIC | Age: 20
End: 2025-04-03
Payer: COMMERCIAL

## 2025-04-09 ENCOUNTER — PATIENT MESSAGE (OUTPATIENT)
Dept: NEUROSURGERY | Facility: CLINIC | Age: 20
End: 2025-04-09
Payer: COMMERCIAL

## 2025-04-17 PROBLEM — G91.8 OTHER HYDROCEPHALUS: Status: ACTIVE | Noted: 2017-11-09

## 2025-04-17 RX ORDER — TETRACAINE HYDROCHLORIDE 5 MG/ML
1 SOLUTION OPHTHALMIC DAILY PRN
COMMUNITY
Start: 2025-01-06

## 2025-04-17 RX ORDER — OFLOXACIN 3 MG/ML
2 SOLUTION/ DROPS OPHTHALMIC EVERY 4 HOURS
COMMUNITY
Start: 2025-01-06

## 2025-04-22 ENCOUNTER — OFFICE VISIT (OUTPATIENT)
Dept: NEUROSURGERY | Facility: CLINIC | Age: 20
End: 2025-04-22
Payer: COMMERCIAL

## 2025-04-22 DIAGNOSIS — Z98.2 S/P VP SHUNT: Primary | ICD-10-CM

## 2025-04-22 NOTE — PROGRESS NOTES
Digital Medicine: Video Consult    The chief complaint leading to consultation is: f/u after ER visit  Patient Location: [facility]  ERROL Y PED [facility] [address] [facility] [place]  Provider is at a distant location to the patients originating location.  Visit type: audiovisual  Present with the patient at the time of the consultation: None    Each patient to whom he or she provides medical services by telemedicine is: (1) informed of the relationship between the physician and patient and the respective role of any other health care provider with respect to management of the patient; and (2) notified that he or she may decline to receive medical services by telemedicine and may withdraw from such care at any time.        Nora Phillip is a 19 y.o. female who returns for evaluation after recent ER visit 4/9/2025.  She has a h/o hydrocephalus s/p ETV x 2 with persistent symptoms and decreased flow on CSF flow studies now s/p right VPS (Delta 1.5) on 10/30/20 and shunt revision 2/5/21 (Certas now at 4).  She reports interval resolution of her neck pain and nausea and denies any new concerns or symptoms.  Headaches are overall improved in frequency since her last visit with me.    Imaging from the ER was reviewed.    - will keep follow up as previously scheduled     15 minutes of total time spent on the encounter, which includes face to face time and non-face to face time preparing to see the patient (eg, review of tests), Obtaining and/or reviewing separately obtained history, Documenting clinical information in the electronic or other health record, Independently interpreting results (not separately reported) and communicating results to the patient/family/caregiver, or Care coordination (not separately reported).

## 2025-04-25 ENCOUNTER — OFFICE VISIT (OUTPATIENT)
Dept: PEDIATRIC NEUROLOGY | Facility: CLINIC | Age: 20
End: 2025-04-25
Payer: COMMERCIAL

## 2025-04-25 DIAGNOSIS — G40.109 FOCAL EPILEPSY: Primary | ICD-10-CM

## 2025-04-25 DIAGNOSIS — G43.001 MIGRAINE WITHOUT AURA AND WITH STATUS MIGRAINOSUS, NOT INTRACTABLE: ICD-10-CM

## 2025-04-25 DIAGNOSIS — G93.5 CHIARI I MALFORMATION: ICD-10-CM

## 2025-04-25 DIAGNOSIS — G40.219 PARTIAL SYMPTOMATIC EPILEPSY WITH COMPLEX PARTIAL SEIZURES, INTRACTABLE, WITHOUT STATUS EPILEPTICUS: ICD-10-CM

## 2025-04-25 DIAGNOSIS — G91.0 COMMUNICATING HYDROCEPHALUS: ICD-10-CM

## 2025-04-25 DIAGNOSIS — G93.89 CEREBRAL VENTRICULOMEGALY: ICD-10-CM

## 2025-04-25 PROCEDURE — 98006 SYNCH AUDIO-VIDEO EST MOD 30: CPT | Mod: 95,,, | Performed by: STUDENT IN AN ORGANIZED HEALTH CARE EDUCATION/TRAINING PROGRAM

## 2025-04-25 PROCEDURE — G2211 COMPLEX E/M VISIT ADD ON: HCPCS | Mod: 95,,, | Performed by: STUDENT IN AN ORGANIZED HEALTH CARE EDUCATION/TRAINING PROGRAM

## 2025-04-25 RX ORDER — MIDAZOLAM 5 MG/.1ML
5 SPRAY NASAL DAILY PRN
Qty: 1 EACH | Refills: 2 | Status: SHIPPED | OUTPATIENT
Start: 2025-04-25

## 2025-04-25 RX ORDER — RIZATRIPTAN BENZOATE 10 MG/1
10 TABLET ORAL DAILY PRN
Qty: 9 TABLET | Refills: 5 | Status: SHIPPED | OUTPATIENT
Start: 2025-04-25

## 2025-04-25 RX ORDER — OXCARBAZEPINE 300 MG/1
450 TABLET, FILM COATED ORAL 2 TIMES DAILY
Qty: 90 TABLET | Refills: 5 | Status: SHIPPED | OUTPATIENT
Start: 2025-04-25

## 2025-04-25 RX ORDER — VITS A,C,E/LUTEIN/MINERALS 300MCG-200
200 TABLET ORAL DAILY
Qty: 30 TABLET | Refills: 3 | Status: SHIPPED | OUTPATIENT
Start: 2025-04-25

## 2025-04-25 NOTE — PROGRESS NOTES
The patient location is: home  The chief complaint leading to consultation is: migraines, epilepsy    Visit type: audiovisual    Face to Face time with patient: 30m  40 minutes of total time spent on the encounter, which includes face to face time and non-face to face time preparing to see the patient (eg, review of tests), Obtaining and/or reviewing separately obtained history, Documenting clinical information in the electronic or other health record, Independently interpreting results (not separately reported) and communicating results to the patient/family/caregiver, or Care coordination (not separately reported).         Each patient to whom he or she provides medical services by telemedicine is:  (1) informed of the relationship between the physician and patient and the respective role of any other health care provider with respect to management of the patient; and (2) notified that he or she may decline to receive medical services by telemedicine and may withdraw from such care at any time.    Notes:        Subjective:      Patient ID: Nora Phillpi is a 19 y.o. female here for   Chief Complaint   Patient presents with    Migraine    Seizures      interim #5: end of march on the 28th was walking to go see advisor and get classes scheduled and was going down a small flight of stairs and didn't trip or anything but all of a sudden her legs just gave out of her and perhaps were a bit shaky; [facility] wouldn't check her out - then a few days later neck was hurting bad - thought perhaps chiari or hydrocephalus     Current HA freq: 8 days out of last 30, with 4 days considered bad/severe  Last HA freq: 3 days out of prior 30d, with 0 days considered bad/severe     Current acute: ibuprofen/rizatriptan   Current preventive: none    Trileptal 450mg bid - taking without seizure or s/e     School is stressful    Sleep: 10pm (1030-11) - 640  Meals: breakfast  Water: bottle     Interim #4:     Current HA freq:  3 days out of last 30, with 0 days considered bad/severe  Last HA freq: 3 days out of prior 30d, with 2 days considered bad/severe     Current acute: ibuprofen/rizatriptan  Current preventive: magox/riboflavin      Trileptal 450mg bid - taking without seizure or s/e     Interim #3:    Current HA freq: 3 days out of last 30, with 2 days considered bad/severe  Last HA freq: 0 days out of prior 30d, with 0 days considered bad/severe     Current acute: ibuprofen/rizatriptan  Current preventive: none;     sLEEP: 10PM-630am   Meals: doesn't skip meals   Water: 16oz per day;     Oxc 450MG twice daily;     Interim #2: At last visit I prescribed ibuprofen and rizatriptan as acute headache treatment and planned to start twice daily magnesium and riboflavin for nutraceutical headache prevention. Planned repeat eeg not done yet. Last OXC level was 21, improved     Current HA freq: 0 days out of last 30, with 0 days considered bad/severe  Last HA freq: 12 days out of prior 30d, with 4 days considered bad/severe     Current acute: ibuprofen/rizatriptan  Current preventive: none;     Interim 1: 12/30 with 4 bad HA currently    Ibuprofen 400mg - works sometimes     17 y.o. female with PMH of IVH, multifocal epilepsy, Chiari I malformation and hydrocephalus s/p ETV      Interval history 01/3/2023:  Very low dose OXC  Had been seizure free for 5 years  Had 2 events early December, with tremors to her arms and legs and was nauseated followed by post ictal lethargy.  Dr. Diaz increased OXC from 450 mg daily to 600 mg daily.  Since then no further seizures.  Has labs with PCM, trileptal level was 10.  Tsh and NA WNL.        he also follows with Dr Taylor since Nov 2017 after she developed hydrocephalus and was admitted for ETV.    shunt was placed 10/31/20  Family history of migraines     She has episodes of overheating and dehydration        CT head 10/30/20- Interval placement of ventriculostomy shunt catheter, as discussed above  with findings consistent with recent procedure, ventricular dilatation again noted although stable when compared to the prior MRI examination with perhaps mild diminished prominence of the temporal horns.  There is no evidence for significant interval detrimental change.     MRI head 2/13/19- No appreciable residual defect along the floor of the 3rd ventricle and no appreciable flow through this region, raising the question of closure of the 3rd ventriculostomy.  Mild prominence of the supratentorial ventricles is unchanged.  Unchanged appearance of tract through the right frontal parenchyma and defect in the septum pellucidum.  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  Unchanged mild inferior descent of the cerebellar tonsils, with CSF flow through the foramen magnum.     MRI head 11/27/17- Interval operative change status post right frontal approach endoscopic third ventriculostomy. There is small postoperative fluid tract within the right frontal lobe   There is continued though relatively similar moderate distention of the lateral and third ventricles   Continued small caliber fourth ventricle with configuration posterior fossa compatible with Chiari I malformation     MRI head 8/21/17- Moderate distention of the lateral and third ventricles which may represent ventriculomegaly in light of history however uncertain of chronicity and lack of prior imaging for comparison. There is trace flair hyperintensity in the periventricular white matter suggestive for scarring versus small component of transependymal resorption of CSF. Clinical correlation advised.  Chiari I malformation.  Superimposed colpocephaly with small caliber white matter parietal lobes concerning for possible prior PVL     EEG 8.21.17 read by me- multifocal sharps most frequent over right frontal area        Current Outpatient Medications   Medication Instructions    cloNIDine (CATAPRES) 0.2 mg    cloNIDine 0.2 mg/24 hr td ptwk  (CATAPRES) 0.2 mg/24 hr 0.2mg per day 1/day    dextroamphetamine-amphetamine 5 mg Tab 2 tablets    famotidine (PEPCID) 20 mg, Oral, 2 times daily    LO LOESTRIN FE 1 mg-10 mcg (24)/10 mcg (2) Tab 1 tablet, Daily    NAYZILAM 5 mg, Each Nostril, Daily PRN    ofloxacin (OCUFLOX) 0.3 % ophthalmic solution 2 drops, Every 4 hours    ondansetron (ZOFRAN) 4 mg, Oral, Every 6 hours PRN    OXcarbazepine (TRILEPTAL) 450 mg, Oral, 2 times daily    rizatriptan (MAXALT) 10 mg, Oral, Daily PRN    TETRAcaine HCL 0.5% (PONTOCAINE) 0.5 % ophthalmic solution 1 drop, Daily PRN          Review of Systems   Constitutional:  Negative for fatigue, fever and unexpected weight change.   HENT:  Negative for congestion, dental problem, ear pain, hearing loss, sinus pain and sore throat.    Eyes:  Positive for photophobia. Negative for pain and visual disturbance.   Respiratory:  Negative for cough and shortness of breath.    Cardiovascular:  Negative for chest pain and palpitations.   Gastrointestinal:  Positive for nausea. Negative for abdominal pain, constipation, diarrhea and vomiting.   Genitourinary:  Negative for difficulty urinating.   Musculoskeletal:  Negative for arthralgias, back pain, gait problem and neck pain.   Skin:  Negative for rash.   Allergic/Immunologic: Negative for environmental allergies.   Neurological:  Positive for seizures and headaches. Negative for dizziness, tremors, syncope, speech difficulty, weakness, light-headedness and numbness.   Hematological:  Does not bruise/bleed easily.   Psychiatric/Behavioral:  Negative for confusion and sleep disturbance. The patient is not nervous/anxious.        Objective:   Neurologic Exam     Mental Status   Follows 2 step commands.   Attention: normal. Concentration: normal.   Speech: speech is normal   Level of consciousness: alert    Cranial Nerves     CN II   Visual fields full to confrontation.     CN III, IV, VI   Pupils are equal, round, and reactive to  light.  Extraocular motions are normal.   Nystagmus: none     CN V   Facial sensation intact.     CN VII   Facial expression full, symmetric.     CN VIII   Hearing: intact    CN XII   Tongue deviation: none    Motor Exam   Muscle bulk: normal  Overall muscle tone: normal    Strength   Strength 5/5 throughout.     Sensory Exam   Light touch normal.     Gait, Coordination, and Reflexes     Gait  Gait: normal    Coordination   Finger to nose coordination: normal  Tandem walking coordination: normal    Reflexes   Right brachioradialis: 2+  Right biceps: 2+  Right triceps: 2+  Right patellar: 2+  Right achilles: 2+  Right ankle clonus: absent  Left ankle clonus: absent    There were no vitals taken for this visit.     Physical Exam  Vitals reviewed.   Constitutional:       Appearance: Normal appearance.   HENT:      Head: Normocephalic.   Eyes:      Conjunctiva/sclera: Conjunctivae normal.      Pupils: Pupils are equal, round, and reactive to light.   Pulmonary:      Effort: Pulmonary effort is normal. No respiratory distress.   Musculoskeletal:         General: No swelling. Normal range of motion.   Neurological:      Mental Status: She is alert.      Coordination: Finger-Nose-Finger Test normal.      Gait: Gait is intact. Tandem walk normal.      Deep Tendon Reflexes:      Reflex Scores:       Tricep reflexes are 2+ on the right side.       Bicep reflexes are 2+ on the right side.       Brachioradialis reflexes are 2+ on the right side.       Patellar reflexes are 2+ on the right side.       Achilles reflexes are 2+ on the right side.  Psychiatric:         Speech: Speech normal.       Assessment:     Nora is a 19 Years old female with PMH of IVH, multifocal epilepsy, Chiari I malformation and hydrocephalus s/p ETV  who presents for evaluation of epilepsy, migraine     This patient meets criteria for a diagnosis of Episodic Migraine w/o aura due to the following:    Recurrent (at least 5) episodes of moderate to severe  head pain lasting (2 or more) hours and accompanied by:  - Nausea and/or vomiting  - Photophobia  - Phonophobia     Seizures well controlled, will repeat EEG next appt and could consider wean in future, continuing OXC for now     Headaches have remained low frequency even without nutraceuticals so suggested adding these for even more improvement if possible;    Plan:     Plan:     Continue OXC 450mg BID;  Nayzilam for seizure > 5min     Repeat EEG - if normal can attempt slow wean    Acute abortive treatment:    When migraine symptoms first develop, the patient should rest or sleep in a dark, quiet room with a cool cloth applied to forehead if possible. Early use of medication during the migraine attack, when the headache is still mild, is important     Step 1: For mild headaches or as first step in treatment, give ibuprofen solution or tablet 400mg every 4 to 6 hours as needed (max 4 doses in 24 hours)    -Limit to 14 days per month maximum to avoid medication overuse headache    -If this medication proves ineffective, would next try naproxen sodium tablet 440mg every 8 to 12 hours as needed (max daily dose 1000mg)     Step 2: If step 1 medication does not get rid of headache, or if headache is severe from the start, also give rizatriptan 10mg oral tablet    -This dose may be repeated a second time if headache still remains after 2 hours, with maximum of 2 doses per 24 hours    -Limit use to 9 days per month to avoid medication overuse headache    -You may combine this medication with naproxen 5mg/kg for better effect if it is only somewhat effective    - Side effects may include chest pain/pressure/tightness, hot/cold flashes, sore throat, fatigue, feeling of heaviness, tingling, jaw pain/pressure, neck pain    -If this medication proves ineffective, would next try rizatriptan 10mg ODT    Daily preventive treatment    Given that this patient has frequent or long-lasting migraines, migraines that cause significant  disability, will initiate prevention at this time with:    1) riboflavin (vitamin B2) 400mg per day in 1-2 doses. This may cause stomach upset if taken on empty stomach. It can cause bright yellow or yellow-orange discoloration of urine   2) elemental magnesium or magnesium oxide at 400MG in 1-2 doses. May cause diarrhea    They have previously tried 0 other preventive medications which were stopped for either side effects or lack of efficacy    -Should be continued for at least 6-8 weeks before determining effectiveness    -Headache diary should be maintained so that frequency of headaches can be compared once on the medication   -If this proves ineffective or side effects are not tolerated, would next try amitriptyline    -If medication proves effective, it should be continued for at least 6-12 months before considering to wean medication     Lifestyle measures   Education: Check out [employer] for more education on headaches, a website created by pediatric headache specialists   Sleep: Work on getting sufficient sleep along with keeping relatively constant bedtime and wake-up times on weekdays and weekends  Exercise: Regular exercise for at least 30 minutes a day for 5 days a week may decrease frequency of headaches   Hydration: Aim to drink at least 64 ounces of water every day, ideally 80 ounces. Carry a water bottle around to school to make this easier   Meals: Avoid fasting or skipping meals because this may trigger headaches     Utilize mychart to notify office of side effects, effects of acute medications after 2-3 tries, effects of preventive medications after 6-8 weeks    Return to clinic in ~6 months for reassessment     Grant Varghese MD  [facility]   [facility]

## 2025-06-09 ENCOUNTER — TELEPHONE (OUTPATIENT)
Dept: PEDIATRIC NEUROLOGY | Facility: CLINIC | Age: 20
End: 2025-06-09
Payer: COMMERCIAL

## 2025-06-18 RX ORDER — TRIAMCINOLONE ACETONIDE 1 MG/G
1 CREAM TOPICAL NIGHTLY
COMMUNITY
Start: 2025-05-16

## 2025-06-18 RX ORDER — LORATADINE 10 MG/1
10 TABLET ORAL DAILY
COMMUNITY
Start: 2025-05-16

## 2025-06-18 RX ORDER — NYSTATIN 100000 U/G
1 CREAM TOPICAL 2 TIMES DAILY
COMMUNITY
Start: 2025-05-16

## 2025-07-02 ENCOUNTER — PATIENT MESSAGE (OUTPATIENT)
Dept: PEDIATRIC NEUROLOGY | Facility: CLINIC | Age: 20
End: 2025-07-02
Payer: COMMERCIAL

## 2025-07-09 ENCOUNTER — PROCEDURE VISIT (OUTPATIENT)
Dept: PEDIATRIC NEUROLOGY | Facility: CLINIC | Age: 20
End: 2025-07-09
Payer: COMMERCIAL

## 2025-07-09 DIAGNOSIS — G40.219 PARTIAL SYMPTOMATIC EPILEPSY WITH COMPLEX PARTIAL SEIZURES, INTRACTABLE, WITHOUT STATUS EPILEPTICUS: ICD-10-CM

## 2025-07-09 DIAGNOSIS — G40.109 FOCAL EPILEPSY: ICD-10-CM

## 2025-07-09 PROCEDURE — 95819 EEG AWAKE AND ASLEEP: CPT | Mod: S$GLB,,, | Performed by: STUDENT IN AN ORGANIZED HEALTH CARE EDUCATION/TRAINING PROGRAM

## 2025-07-14 ENCOUNTER — PATIENT MESSAGE (OUTPATIENT)
Dept: PEDIATRIC NEUROLOGY | Facility: CLINIC | Age: 20
End: 2025-07-14
Payer: COMMERCIAL

## 2025-07-21 NOTE — PROCEDURES
EEG,w/awake & asleep record    Date/Time: 7/9/2025 11:00 AM    Performed by: Carlin Pack MD  Authorized by: Grant Varghese MD      ELECTROENCEPHALOGRAM REPORT    DATE OF SERVICE: 07/09/25  EEG NUMBER: OP   REQUESTED BY: Dr. Varghese  LOCATION OF SERVICE: OP    Clinical History: Nora Phillip is a 20 y.o. female with migraine and epilepsy.    Current Medications[1]    METHODOLOGY   Electroencephalographic (EEG) recording is with electrodes placed according to the International 10-20 placement system.  Thirty two (32) channels of digital signal (sampling rate of 512/sec) including T1 and T2 was simultaneously recorded from the scalp and may include  EKG, EMG, and/or eye monitors.  Recording band pass was 0.1 to 512 hz.  Digital video recording of the patient is simultaneously recorded with the EEG.  The patient is instructed report clinical symptoms which may occur during the recording session.  EEG and video recording is stored and archived in digital format. Activation procedures which include photic stimulation, hyperventilation and instructing patients to perform simple task are done in selected patients.    The EEG is displayed on a monitor screen and can be reviewed using different montages.  Computer assisted analysis is employed to detect spike and electrographic seizure activity.   The entire record is submitted for computer analysis.  The entire recording is visually reviewed and the times identified by computer analysis as being spikes or seizures are reviewed again.  Compresses spectral analysis (CSA) is also performed on the activity recorded from each individual channel.  This is displayed as a power display of frequencies from 0 to 30 Hz over time.   The CSA is reviewed looking for asymmetries in power between homologous areas of the scalp and then compared with the original EEG recording.     [employer] software was also utilized in the review of this study.  This software suite  analyzes the EEG recording in multiple domains.  Coherence and rhythmicity is computed to identify EEG sections which may contain organized seizures.  Each channel undergoes analysis to detect presence of spike and sharp waves which have special and morphological characteristic of epileptic activity.  The routine EEG recording is converted from spacial into frequency domain.  This is then displayed comparing homologous areas to identify areas of significant asymmetry.  Algorithm to identify non-cortically generated artifact is used to separate eye movement, EMG and other artifact from the EEG    Conditions of recording: This 47 minute EEG was record with the patient awake, drowsy and asleep.    Description:  The record was well organized. The waking EEG was characterized by a 10-11 Hz posterior dominant rhythm.  The background over the rest of the head was predominantly in the alpha frequency range. Faster activity in the beta frequency range was present bifrontally. There was a well-developed anterior-posterior gradient.  Drowsiness was characterized by attenuation of the posterior background rhythm.Stage 1 sleep was characterized by symmetric vertex waves. Stage 2 sleep was characterized by the appearance of symmetric sleep spindles.    Abnormal findings  Paroxysmal bursts of frontally predominant delta slowing with embedded sharp waves      Activation procedures:Hyperventilation for 3 minutes with good effort produced generalized slowing, but did not activate abnormal potentials. Photic stimulation did not alter the record.    Cardiac rhythm:The EKG showed a normal sinus rhythm throughout.    Classifications:  FIRDA+sharps    Comparison with prior EEG: Unchanged    Clinical impression  This was an abnormal EEG indicative of a focal cerebral dysfunction and a potentially epileptogenic area in the frontal regions. There were no seizures captured in this record.    Carlin Pack MD         [1]   Current  Outpatient Medications   Medication Sig Dispense Refill    cloNIDine (CATAPRES) 0.2 MG tablet Take 0.2 mg by mouth.      cloNIDine 0.2 mg/24 hr td ptwk (CATAPRES) 0.2 mg/24 hr 0.2mg per day 1/day      dextroamphetamine-amphetamine 5 mg Tab Take 2 tablets by mouth.      famotidine (PEPCID) 20 MG tablet Take 1 tablet (20 mg total) by mouth 2 (two) times daily. 60 tablet 12    LO LOESTRIN FE 1 mg-10 mcg (24)/10 mcg (2) Tab Take 1 tablet by mouth once daily.      loratadine (CLARITIN) 10 mg tablet Take 10 mg by mouth once daily.      magnesium oxide 200 mg magnesium Tab Take 200 mg by mouth once daily. 30 tablet 3    NAYZILAM 5 mg/spray (0.1 mL) Spry 5 mg by Each Nostril route daily as needed (seizure > 5 min). 1 each 2    nystatin (MYCOSTATIN) cream Apply 1 g topically 2 (two) times daily.      ondansetron (ZOFRAN) 4 MG tablet Take 1 tablet (4 mg total) by mouth every 6 (six) hours as needed for Nausea. 30 tablet 6    OXcarbazepine (TRILEPTAL) 300 MG Tab Take 1.5 tablets (450 mg total) by mouth 2 (two) times daily. 90 tablet 5    rizatriptan (MAXALT) 10 MG tablet Take 1 tablet (10 mg total) by mouth daily as needed for Migraine. 9 tablet 5    triamcinolone acetonide 0.1% (KENALOG) 0.1 % cream Apply 1 g topically every evening.       No current facility-administered medications for this visit.

## 2025-07-25 ENCOUNTER — OFFICE VISIT (OUTPATIENT)
Dept: PEDIATRIC NEUROLOGY | Facility: CLINIC | Age: 20
End: 2025-07-25
Payer: COMMERCIAL

## 2025-07-25 DIAGNOSIS — G40.219 PARTIAL SYMPTOMATIC EPILEPSY WITH COMPLEX PARTIAL SEIZURES, INTRACTABLE, WITHOUT STATUS EPILEPTICUS: ICD-10-CM

## 2025-07-25 DIAGNOSIS — G40.109 FOCAL EPILEPSY: ICD-10-CM

## 2025-07-25 DIAGNOSIS — G43.001 MIGRAINE WITHOUT AURA AND WITH STATUS MIGRAINOSUS, NOT INTRACTABLE: Primary | ICD-10-CM

## 2025-07-25 PROCEDURE — 1159F MED LIST DOCD IN RCRD: CPT | Mod: CPTII,95,, | Performed by: STUDENT IN AN ORGANIZED HEALTH CARE EDUCATION/TRAINING PROGRAM

## 2025-07-25 PROCEDURE — G2211 COMPLEX E/M VISIT ADD ON: HCPCS | Mod: 95,,, | Performed by: STUDENT IN AN ORGANIZED HEALTH CARE EDUCATION/TRAINING PROGRAM

## 2025-07-25 PROCEDURE — 1160F RVW MEDS BY RX/DR IN RCRD: CPT | Mod: CPTII,95,, | Performed by: STUDENT IN AN ORGANIZED HEALTH CARE EDUCATION/TRAINING PROGRAM

## 2025-07-25 PROCEDURE — 98004 SYNCH AUDIO-VIDEO EST SF 10: CPT | Mod: 95,,, | Performed by: STUDENT IN AN ORGANIZED HEALTH CARE EDUCATION/TRAINING PROGRAM

## 2025-07-25 RX ORDER — OXCARBAZEPINE 300 MG/1
450 TABLET, FILM COATED ORAL 2 TIMES DAILY
Qty: 90 TABLET | Refills: 5 | Status: SHIPPED | OUTPATIENT
Start: 2025-07-25

## 2025-07-25 RX ORDER — MIDAZOLAM 5 MG/.1ML
5 SPRAY NASAL DAILY PRN
Qty: 1 EACH | Refills: 2 | Status: SHIPPED | OUTPATIENT
Start: 2025-07-25

## 2025-07-25 RX ORDER — RIZATRIPTAN BENZOATE 10 MG/1
10 TABLET ORAL DAILY PRN
Qty: 9 TABLET | Refills: 5 | Status: SHIPPED | OUTPATIENT
Start: 2025-07-25

## (undated) DEVICE — SEE MEDLINE ITEM 157150

## (undated) DEVICE — MARKER SKIN STND TIP BLUE BARR

## (undated) DEVICE — LINE 60IN PRESSURE MON.

## (undated) DEVICE — DRAPE INCISE IOBAN 2 23X17IN

## (undated) DEVICE — SYR MARK 7 ARTERION 150ML

## (undated) DEVICE — TUBE FRAZIER 5MM 2FT SOFT TIP

## (undated) DEVICE — DRESSING TRANS 2X2 TEGADERM

## (undated) DEVICE — TRACKER PATIENT NON INVASIVE

## (undated) DEVICE — DURAPREP SURG SCRUB 26ML

## (undated) DEVICE — TAPE SURG MEDIPORE 6X72IN

## (undated) DEVICE — ADAPTER TUBING 15G 25/BX

## (undated) DEVICE — BLADE SURG CARBON STEEL SZ11

## (undated) DEVICE — CATH 5FR MP 100CM

## (undated) DEVICE — SHEATH INTRODUCER 4FR 11CM

## (undated) DEVICE — STYLET AXIEM 23CM SINGLE COIL

## (undated) DEVICE — PEN NEURO ENDOSCOPE RIGID

## (undated) DEVICE — GAUZE SPONGE 4X4 12PLY

## (undated) DEVICE — ELECTRODE REM PLYHSV RETURN 9

## (undated) DEVICE — DIFFUSER

## (undated) DEVICE — SUT VICRYL PLUS 3-0 SH 18IN

## (undated) DEVICE — GUIDEWIRE SUPRA CORE 035 190CM

## (undated) DEVICE — BIT PERFORATOR LARGE

## (undated) DEVICE — BIT DRILL PERFORATOR DISP 14MM

## (undated) DEVICE — KIT CUSTOM MANIFOLD

## (undated) DEVICE — SPRAY MASTISOL

## (undated) DEVICE — DRESSING TELFA STRL 4X3 LF

## (undated) DEVICE — NDL STRAIGHT 4CM LEIBINGER

## (undated) DEVICE — DRAPE STERI INSTRUMENT 1018

## (undated) DEVICE — CORD BIPOLAR 10/CASE

## (undated) DEVICE — CORD BIPOLAR 12 FOOT

## (undated) DEVICE — CATH ANG PIGTAIL 4FR INFINITY

## (undated) DEVICE — CLIP MED TICALL

## (undated) DEVICE — GUIDEWIRE EMERALD 150CM PTFE

## (undated) DEVICE — BUR BONE CUT MICRO TPS 3X3.8MM

## (undated) DEVICE — INTRODUCER 7FR

## (undated) DEVICE — TRAY LUMBAR PUNCTURE ADULT

## (undated) DEVICE — SOL LR INJ 1000ML BG

## (undated) DEVICE — CARTRIDGE OIL

## (undated) DEVICE — SUT MCRYL PLUS 4-0 PS2 27IN

## (undated) DEVICE — TRACER ZIEM POINTER

## (undated) DEVICE — SUT CTD VICRYL CR/RB-1 4-0

## (undated) DEVICE — SEE MEDLINE ITEM 157103

## (undated) DEVICE — CATH INFINITI 4F 3DRC 100CM

## (undated) DEVICE — RUBBERBAND STERILE 3X1/8IN

## (undated) DEVICE — SEE MEDLINE ITEM 156905

## (undated) DEVICE — CATH PASSER DISPOSABLE

## (undated) DEVICE — SET DECANTER MEDICHOICE

## (undated) DEVICE — CHANNEL NEURO ENDOSCOPE

## (undated) DEVICE — MARKERS SPHERZ PASSIVE

## (undated) DEVICE — DRAPE STERI-DRAPE 1000 17X11IN

## (undated) DEVICE — SYR DISP LL 5CC

## (undated) DEVICE — DRESSING TRANS 4X4 TEGADERM

## (undated) DEVICE — CLOSURE SKIN STERI STRIP 1/4X3

## (undated) DEVICE — SUT 2-0 12-18IN SILK

## (undated) DEVICE — KIT MICROINTRO 4F .018X40X7CM

## (undated) DEVICE — VISIPAQUE 320 200ML +PK

## (undated) DEVICE — PINS SKULL ADULT MAYFIELD
Type: IMPLANTABLE DEVICE | Site: CRANIAL | Status: NON-FUNCTIONAL
Removed: 2020-05-07

## (undated) DEVICE — ADHESIVE MASTISOL VIAL 48/BX

## (undated) DEVICE — ADHESIVE DERMABOND ADVANCED

## (undated) DEVICE — KIT ANTIFOG

## (undated) DEVICE — SHEET EENT SPLIT

## (undated) DEVICE — PACK PEDIATRIC ANGIOGRAPHY OMC

## (undated) DEVICE — KIT MNTR POLE MT DUL 12&48 MAC

## (undated) DEVICE — SUT GUT PL. 4-0 27 FS-2

## (undated) DEVICE — POINTER AXIEM CRANIAL TRACER

## (undated) DEVICE — PROTECTION STATION PLUS

## (undated) DEVICE — SPIKE SHORT LG BORE 1-WAY 2IN

## (undated) DEVICE — GUIDE WIRE WHOLLY FLOPPY

## (undated) DEVICE — DRESSING SURGICAL 1/2X1/2

## (undated) DEVICE — SEE MEDLINE ITEM 157166

## (undated) DEVICE — COVER PROBE US 5.5X58L NON LTX

## (undated) DEVICE — CATH IV INTROCAN 14G X 2.

## (undated) DEVICE — CATH CV QD LUMN 6FRX110CM

## (undated) DEVICE — DRESSING TELFA N ADH 3X8

## (undated) DEVICE — SYR SLIP TIP 1CC

## (undated) DEVICE — CATH INFINITI 4F JL4 .042X100

## (undated) DEVICE — SHEATH INTRODUCER 6FR 11CM

## (undated) DEVICE — PACEMAKER

## (undated) DEVICE — KIT EXTERNAL DRAIN(CRAINIAL)

## (undated) DEVICE — Device

## (undated) DEVICE — STOPCOCK 3-WAY

## (undated) DEVICE — KIT SURGIFLO EVITHROM

## (undated) DEVICE — TROCAR ENDOPATH XCEL 5MM 7.5CM